# Patient Record
Sex: FEMALE | Race: WHITE | URBAN - METROPOLITAN AREA
[De-identification: names, ages, dates, MRNs, and addresses within clinical notes are randomized per-mention and may not be internally consistent; named-entity substitution may affect disease eponyms.]

---

## 2019-07-26 ENCOUNTER — APPOINTMENT (RX ONLY)
Dept: URBAN - METROPOLITAN AREA CLINIC 39 | Facility: CLINIC | Age: 34
Setting detail: DERMATOLOGY
End: 2019-07-26

## 2019-07-26 DIAGNOSIS — D18.0 HEMANGIOMA: ICD-10-CM

## 2019-07-26 DIAGNOSIS — L30.1 DYSHIDROSIS [POMPHOLYX]: ICD-10-CM

## 2019-07-26 DIAGNOSIS — D22 MELANOCYTIC NEVI: ICD-10-CM

## 2019-07-26 DIAGNOSIS — L74.0 MILIARIA RUBRA: ICD-10-CM

## 2019-07-26 DIAGNOSIS — L81.4 OTHER MELANIN HYPERPIGMENTATION: ICD-10-CM

## 2019-07-26 PROBLEM — D22.72 MELANOCYTIC NEVI OF LEFT LOWER LIMB, INCLUDING HIP: Status: ACTIVE | Noted: 2019-07-26

## 2019-07-26 PROBLEM — D22.5 MELANOCYTIC NEVI OF TRUNK: Status: ACTIVE | Noted: 2019-07-26

## 2019-07-26 PROBLEM — F32.9 MAJOR DEPRESSIVE DISORDER, SINGLE EPISODE, UNSPECIFIED: Status: ACTIVE | Noted: 2019-07-26

## 2019-07-26 PROBLEM — D22.71 MELANOCYTIC NEVI OF RIGHT LOWER LIMB, INCLUDING HIP: Status: ACTIVE | Noted: 2019-07-26

## 2019-07-26 PROBLEM — D18.01 HEMANGIOMA OF SKIN AND SUBCUTANEOUS TISSUE: Status: ACTIVE | Noted: 2019-07-26

## 2019-07-26 PROBLEM — L30.9 DERMATITIS, UNSPECIFIED: Status: ACTIVE | Noted: 2019-07-26

## 2019-07-26 PROBLEM — D22.39 MELANOCYTIC NEVI OF OTHER PARTS OF FACE: Status: ACTIVE | Noted: 2019-07-26

## 2019-07-26 PROCEDURE — ? COUNSELING

## 2019-07-26 PROCEDURE — 99203 OFFICE O/P NEW LOW 30 MIN: CPT

## 2019-07-26 PROCEDURE — ? TREATMENT REGIMEN

## 2019-07-26 ASSESSMENT — LOCATION SIMPLE DESCRIPTION DERM
LOCATION SIMPLE: ABDOMEN
LOCATION SIMPLE: LEFT FOOT
LOCATION SIMPLE: LEFT CHEEK
LOCATION SIMPLE: LEFT THIGH
LOCATION SIMPLE: RIGHT THIGH
LOCATION SIMPLE: RIGHT HAND
LOCATION SIMPLE: LEFT HAND
LOCATION SIMPLE: RIGHT UPPER BACK
LOCATION SIMPLE: UPPER BACK
LOCATION SIMPLE: LEFT POSTERIOR THIGH

## 2019-07-26 ASSESSMENT — LOCATION DETAILED DESCRIPTION DERM
LOCATION DETAILED: EPIGASTRIC SKIN
LOCATION DETAILED: LEFT ANTERIOR DISTAL THIGH
LOCATION DETAILED: RIGHT INFERIOR UPPER BACK
LOCATION DETAILED: RIGHT ANTERIOR PROXIMAL THIGH
LOCATION DETAILED: LEFT DISTAL POSTERIOR THIGH
LOCATION DETAILED: LEFT ULNAR PALM
LOCATION DETAILED: LEFT INFERIOR CENTRAL MALAR CHEEK
LOCATION DETAILED: RIGHT ULNAR PALM
LOCATION DETAILED: INFERIOR THORACIC SPINE
LOCATION DETAILED: LEFT DORSAL FOOT
LOCATION DETAILED: 1ST WEBSPACE LEFT FOOT

## 2019-07-26 ASSESSMENT — LOCATION ZONE DERM
LOCATION ZONE: HAND
LOCATION ZONE: LEG
LOCATION ZONE: FACE
LOCATION ZONE: TRUNK
LOCATION ZONE: FEET

## 2019-07-26 ASSESSMENT — PAIN INTENSITY VAS: HOW INTENSE IS YOUR PAIN 0 BEING NO PAIN, 10 BEING THE MOST SEVERE PAIN POSSIBLE?: NO PAIN

## 2019-07-26 NOTE — PROCEDURE: TREATMENT REGIMEN
Detail Level: Zone
Plan: Pt. does report rash developing two days after trip to [employer] [place], so question possible pseudomonas folliculitis, especially given density of papules in areas covered by swimsuit.  However, no pustules, and rash is completely asymptomatic per pt.  Could also consider miliaria rubra.  Discussed either should be self-limited and resolve within the next 1-2 weeks.  Recommended keeping affected areas cool and dry.  No further treatment today
Plan: Pt. declines treatment today, but she can call for Rx topical steroid if rash worsens this fall/winter
Detail Level: Simple